# Patient Record
(demographics unavailable — no encounter records)

---

## 2024-10-15 NOTE — PAST MEDICAL HISTORY
[Menopause Age____] : age at menopause was [unfilled] [History of Hormone Replacement Treatment] : has a history of hormone replacement treatment [de-identified] : HRT x 3-4 months only

## 2024-10-15 NOTE — PHYSICAL EXAM
[Alert] : alert [Well Nourished] : well nourished [No Acute Distress] : no acute distress [Well Developed] : well developed [Normal Sclera/Conjunctiva] : normal sclera/conjunctiva [EOMI] : extra ocular movement intact [No Proptosis] : no proptosis [Thyroid Not Enlarged] : the thyroid was not enlarged [No Thyroid Nodules] : no palpable thyroid nodules [Clear to Auscultation] : lungs were clear to auscultation bilaterally [Normal S1, S2] : normal S1 and S2 [Normal Rate] : heart rate was normal [Regular Rhythm] : with a regular rhythm [No Edema] : no peripheral edema [Normal Bowel Sounds] : normal bowel sounds [Not Tender] : non-tender [Not Distended] : not distended [Soft] : abdomen soft [Normal Anterior Cervical Nodes] : no anterior cervical lymphadenopathy [No Spinal Tenderness] : no spinal tenderness [Spine Straight] : spine straight [No Stigmata of Cushings Syndrome] : no stigmata of Cushings Syndrome [Normal Gait] : normal gait [Normal Reflexes] : deep tendon reflexes were 2+ and symmetric [No Tremors] : no tremors [Oriented x3] : oriented to person, place, and time [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

## 2024-10-15 NOTE — PAST MEDICAL HISTORY
[Menopause Age____] : age at menopause was [unfilled] [History of Hormone Replacement Treatment] : has a history of hormone replacement treatment [de-identified] : HRT x 3-4 months only

## 2024-10-15 NOTE — ASSESSMENT
[Bisphosphonate Therapy] : Risks and benefits of bisphosphonate therapy were  discussed with the patient including gastroesophageal irritation, osteonecrosis of the jaw, and atypical femur fractures, and acute phase reaction [Bisphosphonates] : The patient was instructed to take bisphosphonates on an empty stomach with a full glass of water,and wait at least 30 minutes before eating or lying down [FreeTextEntry1] : She has been on Fosamax for 5 years which she is taken correctly and tolerated well. BMD 2017 is fairly stable, lowest value femoral neck -2.5. BMD 10/2020 is consistent with osteopenia. Pt began drug holiday 10/2020.  BMD 9/2021 indicates stable normal spine but not accurate due to arthritis, and worsened osteopenia in hip and proximal radius. Pt began delayed release risedronate 9/2021, taking correctly tolerating well. BMD 10/2022 indicates that there was a decrease in bone density in the femoral neck, other sites stabilized. BMD results reviewed w/ pt.  Began Atelvia. taking correctly tolerating well No UGI sx. No thigh pain. Last DDS few mos ago. No interval fractures.  BMD 2023 stable.  Hemoglobin A1c shows prediabetes. Natural history of prediabetes discussed in detail. Pt advised re: watching weight, maintaining moderate to low carbohydrate intake. Controversy concerning use of metformin for prediabetes reviewed.  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 repeat BMD next visit

## 2024-10-15 NOTE — REASON FOR VISIT
[Follow - Up] : a follow-up visit [Osteoporosis] : osteoporosis [FreeTextEntry2] : Gianfranco Montague MD

## 2024-10-15 NOTE — PHYSICAL EXAM
[Alert] : alert [Well Nourished] : well nourished [No Acute Distress] : no acute distress [Well Developed] : well developed [Normal Sclera/Conjunctiva] : normal sclera/conjunctiva [EOMI] : extra ocular movement intact [No Proptosis] : no proptosis [Thyroid Not Enlarged] : the thyroid was not enlarged [No Thyroid Nodules] : no palpable thyroid nodules [Clear to Auscultation] : lungs were clear to auscultation bilaterally [Normal S1, S2] : normal S1 and S2 [Normal Rate] : heart rate was normal [Regular Rhythm] : with a regular rhythm [No Edema] : no peripheral edema [Normal Bowel Sounds] : normal bowel sounds [Not Tender] : non-tender [Not Distended] : not distended [Soft] : abdomen soft [Normal Anterior Cervical Nodes] : no anterior cervical lymphadenopathy [No Spinal Tenderness] : no spinal tenderness [Spine Straight] : spine straight [No Stigmata of Cushings Syndrome] : no stigmata of Cushings Syndrome [Normal Gait] : normal gait [Normal Reflexes] : deep tendon reflexes were 2+ and symmetric [No Tremors] : no tremors [Oriented x3] : oriented to person, place, and time [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

## 2024-10-15 NOTE — PROCEDURE
[FreeTextEntry1] : Bone mineral density: 10/13/2023 indication: compare to 2022 assess response to medication  spine not performed total hip -2.0  osteopenia, no significant change  femoral neck -2.4  osteopenia, no significant change  proximal radius -2.6  osteoporosis, no significant change   Bone Density 10/13/2022 Indication vs 2021 asses response to medication  Spine: not performed  Total Hip -1.8 osteopenia , no significant change  Femoral Neck -2.3 osteopenia -4.9%  Proximal Radius -2.3 osteopenia  no significant change   Bone mineral density: 09/30/2021  Indication: vs. 2020 assess response to medication Spine: -0.9 normal, no significant change, but probably arthritic Total hip: -1.8 osteopenia, -3.8% Femoral neck: -2.0 osteopenia, -4.1% Proximal radius: -2.4 osteopenia, -4.4%  Bone mineral density October 21, 2020 Compared to 2018 Spine -1.0 normal but probably arthritic prior -1.2 Total hip -1.6 osteopenia prior -1.8 Femoral neck -1.8 osteopenia prior -2.5 Proximal radius -2.0 osteopenia no prior

## 2024-10-15 NOTE — HISTORY OF PRESENT ILLNESS
[Alendronate (Fosomax)] : Alendronate [Regular Dental Follow-Up] : regular dental follow-up [FreeTextEntry1] : Patient returns for a follow up visit for osteoporosis.  Began Atelvia 1/2022 taking correctly tolerating well No UGI sx. No thigh pain. Last DDS few mos ago. No interval fractures.  The patient was first told of osteopenia approximately 20 years ago and was treated with Fosamax for approximately 1 year. The patient denies any osteoporosis related fractures. She then stopped therapy after bone density improved.  She was off all therapy for approximately 15 years until decrease in bone density 2014. She began Fosamax 70 mg weekly which she is took correctly and tolerated well. BMD 2017 is fairly stable, lowest value femoral neck -2.5. BMD 10/2020 is consistent with osteopenia. Pt began drug holiday 10/2020. Pt began delayed release risedronate 9/2021, taking correctly tolerating well.   Patient denies any unusual risk factors for osteoporosis. H/o low vitamin D in the past.  She is currently taking vitamin D 1000 units a day with normal values.  [Disordered Eating] : no history of disordered eating [Taking Steroids] : no history of taking steroids [Hyperparathyroidism] : no history of hyperparathyroidism [Diabetes] : no history of diabetes [Kidney Stones] : no history of kidney stones [Previous Fragility Fracture] : no previous fragility fracture [Family History of Hip Fracture] : no family history of hip fracture [Family History of Osteoporosis] : no family history of osteoporosis

## 2024-10-18 NOTE — PHYSICAL EXAM
[Appropriately responsive] : appropriately responsive [Alert] : alert [No Acute Distress] : no acute distress [Soft] : soft [Non-tender] : non-tender [Non-distended] : non-distended [No Lesions] : no lesions [No Mass] : no mass [Oriented x3] : oriented x3 [FreeTextEntry5] : nonlabored breathing [Examination Of The Breasts] : a normal appearance [No Masses] : no breast masses were palpable [Labia Majora] : normal [Labia Minora] : normal [Normal] : normal [Tenderness] : nontender [Uterine Adnexae] : normal

## 2024-10-18 NOTE — HISTORY OF PRESENT ILLNESS
[FreeTextEntry1] : Pt is a 81 year old postmenopausal female presenting today for annual exam.   Pt without any concerns at this time.  Denies breast pain, abnormal nipple discharge, abdominal pain, abnormal uterine bleeding, vaginal discharge/irritation, vaginal dryness, dysuria, dyspareunia. Pt is sexually active with males. Pt feels safe & supported in her relationship.  Pt reports regular exercise. Pt reports balanced diet.  PHQ9: 0   PMH Hyperlipidemia, Hx of meniere disease, Hx of osteoporosis, Hx of ovarian cyst and fibroids PSH Melanoma removal OBHx  GynHx Fhx Denies breast, ovarian, uterine, or colon ca Med Risedronate

## 2024-10-18 NOTE — PLAN
[FreeTextEntry1] : #Well person exam -Medical/surgical/family history reviewed -Allergies and medications reconciled -Reviewed breast awareness/calcium/vitamin D/weight bearing exercise   #Health Care Maintenance -Mammogram + breast sono rx given -Colonoscopy UTD -Dexa to be scheduled 2026 (continue following w/ endocrinology) -Cholesterol/vaccinations per PCP   All questions/concerns of pt addressed to their satisfaction.   F/u in 1 yr or sooner PRN.

## 2025-07-02 NOTE — COUNSELING
[Behavioral health counseling provided] : Behavioral health counseling provided [Engage in a relaxing activity] : Engage in a relaxing activity [AUDIT-C Screening administered and reviewed] : AUDIT-C Screening administered and reviewed

## 2025-07-04 NOTE — PHYSICAL EXAM
[No Acute Distress] : no acute distress [Well Nourished] : well nourished [Well Developed] : well developed [Well-Appearing] : well-appearing [Normal Voice/Communication] : normal voice/communication [Normal Sclera/Conjunctiva] : normal sclera/conjunctiva [PERRL] : pupils equal round and reactive to light [EOMI] : extraocular movements intact [Fundoscopic Exam Performed] : fundoscopic ~T exam ~C was performed [Normal Outer Ear/Nose] : the outer ears and nose were normal in appearance [Normal Oropharynx] : the oropharynx was normal [Normal TMs] : both tympanic membranes were normal [Normal Nasal Mucosa] : the nasal mucosa was normal [No JVD] : no jugular venous distention [No Lymphadenopathy] : no lymphadenopathy [Supple] : supple [Thyroid Normal, No Nodules] : the thyroid was normal and there were no nodules present [No Respiratory Distress] : no respiratory distress  [No Accessory Muscle Use] : no accessory muscle use [Clear to Auscultation] : lungs were clear to auscultation bilaterally [Normal Rate] : normal rate  [Regular Rhythm] : with a regular rhythm [Normal S1, S2] : normal S1 and S2 [No Murmur] : no murmur heard [No Carotid Bruits] : no carotid bruits [No Abdominal Bruit] : a ~M bruit was not heard ~T in the abdomen [No Varicosities] : no varicosities [Pedal Pulses Present] : the pedal pulses are present [No Edema] : there was no peripheral edema [No Palpable Aorta] : no palpable aorta [No Extremity Clubbing/Cyanosis] : no extremity clubbing/cyanosis [No Axillary Lymphadenopathy] : no axillary lymphadenopathy [Soft] : abdomen soft [Non Tender] : non-tender [Non-distended] : non-distended [No Masses] : no abdominal mass palpated [No HSM] : no HSM [Normal Bowel Sounds] : normal bowel sounds [Normal Supraclavicular Nodes] : no supraclavicular lymphadenopathy [Normal Axillary Nodes] : no axillary lymphadenopathy [Normal Posterior Cervical Nodes] : no posterior cervical lymphadenopathy [Normal Anterior Cervical Nodes] : no anterior cervical lymphadenopathy [No CVA Tenderness] : no CVA  tenderness [No Spinal Tenderness] : no spinal tenderness [No Joint Swelling] : no joint swelling [Grossly Normal Strength/Tone] : grossly normal strength/tone [No Rash] : no rash [Coordination Grossly Intact] : coordination grossly intact [No Focal Deficits] : no focal deficits [Normal Gait] : normal gait [Speech Grossly Normal] : speech grossly normal [Normal Affect] : the affect was normal [Alert and Oriented x3] : oriented to person, place, and time [Normal Mood] : the mood was normal [Normal Insight/Judgement] : insight and judgment were intact [de-identified] : large defect left calf after melonoma removal

## 2025-07-04 NOTE — ASSESSMENT
[Vaccines Reviewed] : Immunizations reviewed today. Please see immunization details in the vaccine log within the immunization flowsheet.  [FreeTextEntry1] : 80 yo female with h/o osteoporosis on Risedronate, meningioma,  melanoma s/p resection, ASCVD, Hyperlipidemia, Meniere's disease, Vertigo, tinnitus, and hearing loss here for annual wellness visit.  Overall doing well.  Tinnitus and hearing loss are her major problems.  osteoporosis (hip) - risedronate 75 q week and followed by Constantin Adair pulsatile tinnitus/Meniere's - associated vertigo. Better with use of hearing aids to provide white noise at night. Repeat MRI brain unchanged auditory canals.    hyperlipidemia: on statin.  lipids at goal derm: close surveillance.  H/O melanoma. Closed angle glaucoma: Seen by optho and assessed for closed angle glaucoma.   Not dilated.  meningioma: repeat MRI. elevated Hgb A1c: Continues to increase. Continue Lifestyle mods.  Recheck in 3 months.  ASCVD: negative stress tests; echoes, EKGs in the past done routinely by former PCP - negative. RSR' on ecg. Health care maintenance: up to date on immunizations by report.  To get next COVID vaccine soon.  Had Flu shot this year.  Still sees GYN and has mammograms - dense breasts.

## 2025-07-04 NOTE — HISTORY OF PRESENT ILLNESS
[de-identified] : 80 yo female with h/o osteoporosis on Risedronate, meningioma,  melanoma s/p resection, ASCVD, hyperlipidemia, Meniere's disease, Vertigo, tinnitus, and hearing loss here for annual wellness visit.  Overall doing well.  Tinnitus and hearing loss are her major problems.  osteoporosis (hip) - Risedronate 75 q week and followed by Constantin Adair.  pulsatile tinnitus - use of hearing aids to provide white noise at night. Due to Meniere's, Tinnitus better.  Seen by optho and assessed for closed angle glaucoma.  Not dilated. Last mammo okay.

## 2025-07-04 NOTE — HEALTH RISK ASSESSMENT
[Excellent] : ~his/her~  mood as  excellent [No falls in past year] : Patient reported no falls in the past year [Little interest or pleasure doing things] : 1) Little interest or pleasure doing things [Feeling down, depressed, or hopeless] : 2) Feeling down, depressed, or hopeless [0] : 2) Feeling down, depressed, or hopeless: Not at all (0) [PHQ-2 Negative - No further assessment needed] : PHQ-2 Negative - No further assessment needed [facility] : 8 [Former] : Former [0-4] : 0-4 [No Retinopathy] : No retinopathy [Patient reported mammogram was normal] : Patient reported mammogram was normal [Patient reported PAP Smear was normal] : Patient reported PAP Smear was normal [Patient reported bone density results were abnormal] : Patient reported bone density results were abnormal [Patient reported colonoscopy was normal] : Patient reported colonoscopy was normal [With Family] : lives with family [Retired] : retired [College] : College [] :  [Feels Safe at Home] : Feels safe at home [Fully functional (bathing, dressing, toileting, transferring, walking, feeding)] : Fully functional (bathing, dressing, toileting, transferring, walking, feeding) [Fully functional (using the telephone, shopping, preparing meals, housekeeping, doing laundry, using] : Fully functional and needs no help or supervision to perform IADLs (using the telephone, shopping, preparing meals, housekeeping, doing laundry, using transportation, managing medications and managing finances) [Reports changes in hearing] : Reports changes in hearing [Reports normal functional visual acuity (ie: able to read med bottle)] : Reports normal functional visual acuity [Yes] : takes [None] : Patient does not have any barriers to medication adherence [No] : Did not review medication list for presence of high-risk medications. [Sexually Active] : sexually active [de-identified] : endo; ENT; opthalmology; gyn; Ortho [de-identified] : walking [de-identified] : low fat [[id number]] : 0 [LowDoseCTScan] : NA [Change in mental status noted] : No change in mental status noted [Language] : denies difficulty with language [Behavior] : denies difficulty with behavior [Learning/Retaining New Information] : denies difficulty learning/retaining new information [Handling Complex Tasks] : denies difficulty handling complex tasks [Reasoning] : denies difficulty with reasoning [Spatial Ability and Orientation] : denies difficulty with spatial ability and orientation [High Risk Behavior] : no high risk behavior [Reports changes in vision] : Reports no changes in vision [Reports changes in dental health] : Reports no changes in dental health [MammogramDate] : 12- [PapearDate] :  [BoneDensityDate] : 10- [BoneDensityComments] : osteoporosis [ColonoscopyDate] : 9-1-2022 [ColonoscopyComments] : 5 year repeat

## 2025-07-04 NOTE — HISTORY OF PRESENT ILLNESS
[de-identified] : 82 yo female with h/o osteoporosis on Risedronate, meningioma,  melanoma s/p resection, ASCVD, hyperlipidemia, Meniere's disease, Vertigo, tinnitus, and hearing loss here for annual wellness visit.  Overall doing well.  Tinnitus and hearing loss are her major problems.  osteoporosis (hip) - Risedronate 75 q week and followed by Constantin Adair.  pulsatile tinnitus - use of hearing aids to provide white noise at night. Due to Meniere's, Tinnitus better.  Seen by optho and assessed for closed angle glaucoma.  Not dilated. Last mammo okay.

## 2025-07-04 NOTE — PHYSICAL EXAM
[No Acute Distress] : no acute distress [Well Nourished] : well nourished [Well Developed] : well developed [Well-Appearing] : well-appearing [Normal Voice/Communication] : normal voice/communication [Normal Sclera/Conjunctiva] : normal sclera/conjunctiva [PERRL] : pupils equal round and reactive to light [EOMI] : extraocular movements intact [Fundoscopic Exam Performed] : fundoscopic ~T exam ~C was performed [Normal Outer Ear/Nose] : the outer ears and nose were normal in appearance [Normal Oropharynx] : the oropharynx was normal [Normal TMs] : both tympanic membranes were normal [Normal Nasal Mucosa] : the nasal mucosa was normal [No JVD] : no jugular venous distention [No Lymphadenopathy] : no lymphadenopathy [Supple] : supple [Thyroid Normal, No Nodules] : the thyroid was normal and there were no nodules present [No Respiratory Distress] : no respiratory distress  [No Accessory Muscle Use] : no accessory muscle use [Clear to Auscultation] : lungs were clear to auscultation bilaterally [Normal Rate] : normal rate  [Regular Rhythm] : with a regular rhythm [Normal S1, S2] : normal S1 and S2 [No Murmur] : no murmur heard [No Carotid Bruits] : no carotid bruits [No Abdominal Bruit] : a ~M bruit was not heard ~T in the abdomen [No Varicosities] : no varicosities [Pedal Pulses Present] : the pedal pulses are present [No Edema] : there was no peripheral edema [No Palpable Aorta] : no palpable aorta [No Extremity Clubbing/Cyanosis] : no extremity clubbing/cyanosis [No Axillary Lymphadenopathy] : no axillary lymphadenopathy [Soft] : abdomen soft [Non Tender] : non-tender [Non-distended] : non-distended [No Masses] : no abdominal mass palpated [No HSM] : no HSM [Normal Bowel Sounds] : normal bowel sounds [Normal Supraclavicular Nodes] : no supraclavicular lymphadenopathy [Normal Axillary Nodes] : no axillary lymphadenopathy [Normal Posterior Cervical Nodes] : no posterior cervical lymphadenopathy [Normal Anterior Cervical Nodes] : no anterior cervical lymphadenopathy [No CVA Tenderness] : no CVA  tenderness [No Spinal Tenderness] : no spinal tenderness [No Joint Swelling] : no joint swelling [Grossly Normal Strength/Tone] : grossly normal strength/tone [No Rash] : no rash [Coordination Grossly Intact] : coordination grossly intact [No Focal Deficits] : no focal deficits [Normal Gait] : normal gait [Speech Grossly Normal] : speech grossly normal [Normal Affect] : the affect was normal [Alert and Oriented x3] : oriented to person, place, and time [Normal Mood] : the mood was normal [Normal Insight/Judgement] : insight and judgment were intact [de-identified] : large defect left calf after melonoma removal

## 2025-07-04 NOTE — ASSESSMENT
[Vaccines Reviewed] : Immunizations reviewed today. Please see immunization details in the vaccine log within the immunization flowsheet.  [FreeTextEntry1] : 82 yo female with h/o osteoporosis on Risedronate, meningioma,  melanoma s/p resection, ASCVD, Hyperlipidemia, Meniere's disease, Vertigo, tinnitus, and hearing loss here for annual wellness visit.  Overall doing well.  Tinnitus and hearing loss are her major problems.  osteoporosis (hip) - risedronate 75 q week and followed by Constantin Adair pulsatile tinnitus/Meniere's - associated vertigo. Better with use of hearing aids to provide white noise at night. Repeat MRI brain unchanged auditory canals.    hyperlipidemia: on statin.  lipids at goal derm: close surveillance.  H/O melanoma. Closed angle glaucoma: Seen by optho and assessed for closed angle glaucoma.   Not dilated.  meningioma: repeat MRI. elevated Hgb A1c: Continues to increase. Continue Lifestyle mods.  Recheck in 3 months.  ASCVD: negative stress tests; echoes, EKGs in the past done routinely by former PCP - negative. RSR' on ecg. Health care maintenance: up to date on immunizations by report.  To get next COVID vaccine soon.  Had Flu shot this year.  Still sees GYN and has mammograms - dense breasts.

## 2025-07-04 NOTE — REVIEW OF SYSTEMS
[Hearing Loss] : hearing loss [Dizziness] : dizziness [Insomnia] : insomnia [Negative] : Heme/Lymph [Fatigue] : no fatigue [Recent Change In Weight] : ~T no recent weight change [Vision Problems] : no vision problems [de-identified] : h/o melanoma

## 2025-07-04 NOTE — HEALTH RISK ASSESSMENT
[Excellent] : ~his/her~  mood as  excellent [No falls in past year] : Patient reported no falls in the past year [Little interest or pleasure doing things] : 1) Little interest or pleasure doing things [Feeling down, depressed, or hopeless] : 2) Feeling down, depressed, or hopeless [0] : 2) Feeling down, depressed, or hopeless: Not at all (0) [PHQ-2 Negative - No further assessment needed] : PHQ-2 Negative - No further assessment needed [[facility] : 8 [Former] : Former [0-4] : 0-4 [No Retinopathy] : No retinopathy [Patient reported mammogram was normal] : Patient reported mammogram was normal [Patient reported PAP Smear was normal] : Patient reported PAP Smear was normal [Patient reported bone density results were abnormal] : Patient reported bone density results were abnormal [Patient reported colonoscopy was normal] : Patient reported colonoscopy was normal [With Family] : lives with family [Retired] : retired [College] : College [] :  [Feels Safe at Home] : Feels safe at home [Fully functional (bathing, dressing, toileting, transferring, walking, feeding)] : Fully functional (bathing, dressing, toileting, transferring, walking, feeding) [Fully functional (using the telephone, shopping, preparing meals, housekeeping, doing laundry, using] : Fully functional and needs no help or supervision to perform IADLs (using the telephone, shopping, preparing meals, housekeeping, doing laundry, using transportation, managing medications and managing finances) [Reports changes in hearing] : Reports changes in hearing [Reports normal functional visual acuity (ie: able to read med bottle)] : Reports normal functional visual acuity [Yes] : takes [None] : Patient does not have any barriers to medication adherence [No] : Did not review medication list for presence of high-risk medications. [Sexually Active] : sexually active [de-identified] : endo; ENT; opthalmology; gyn; Ortho [de-identified] : walking [de-identified] : low fat [[id number]] : 0 [LowDoseCTScan] : NA [Change in mental status noted] : No change in mental status noted [Language] : denies difficulty with language [Behavior] : denies difficulty with behavior [Learning/Retaining New Information] : denies difficulty learning/retaining new information [Handling Complex Tasks] : denies difficulty handling complex tasks [Reasoning] : denies difficulty with reasoning [Spatial Ability and Orientation] : denies difficulty with spatial ability and orientation [High Risk Behavior] : no high risk behavior [Reports changes in vision] : Reports no changes in vision [Reports changes in dental health] : Reports no changes in dental health [MammogramDate] : 12- [PapearDate] :  [BoneDensityDate] : 10- [BoneDensityComments] : osteoporosis [ColonoscopyDate] : 9-1-2022 [ColonoscopyComments] : 5 year repeat

## 2025-07-04 NOTE — REVIEW OF SYSTEMS
[Hearing Loss] : hearing loss [Dizziness] : dizziness [Insomnia] : insomnia [Negative] : Heme/Lymph [Fatigue] : no fatigue [Recent Change In Weight] : ~T no recent weight change [Vision Problems] : no vision problems [de-identified] : h/o melanoma